# Patient Record
Sex: FEMALE | Race: WHITE | NOT HISPANIC OR LATINO | URBAN - METROPOLITAN AREA
[De-identification: names, ages, dates, MRNs, and addresses within clinical notes are randomized per-mention and may not be internally consistent; named-entity substitution may affect disease eponyms.]

---

## 2018-09-18 ENCOUNTER — OFFICE VISIT (OUTPATIENT)
Dept: URBAN - METROPOLITAN AREA CLINIC 23 | Facility: CLINIC | Age: 78
End: 2018-09-18
Payer: MEDICARE

## 2018-09-18 DIAGNOSIS — H35.3131 NEXDTVE AGE-RELATED MCLR DEGN, BILATERAL, EARLY DRY STAGE: ICD-10-CM

## 2018-09-18 DIAGNOSIS — H47.211 PRIMARY OPTIC ATROPHY, RIGHT EYE: Primary | ICD-10-CM

## 2018-09-18 PROCEDURE — 92014 COMPRE OPH EXAM EST PT 1/>: CPT | Performed by: OPTOMETRIST

## 2018-09-18 ASSESSMENT — INTRAOCULAR PRESSURE
OD: 14
OS: 14

## 2018-09-18 ASSESSMENT — KERATOMETRY
OD: 46.75
OS: 46.25

## 2018-09-18 NOTE — IMPRESSION/PLAN
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 Plan: Discussed diagnosis in detail with patient. No treatment is required at this time. Reassured patient of current condition and treatment. Will continue to observe condition and or symptoms. Recommend that patient have regular vascular exam with her cardiologist at next visit due to sudden vision loss.

## 2019-05-15 ENCOUNTER — OFFICE VISIT (OUTPATIENT)
Dept: URBAN - METROPOLITAN AREA CLINIC 23 | Facility: CLINIC | Age: 79
End: 2019-05-15
Payer: MEDICARE

## 2019-05-15 DIAGNOSIS — H53.19 OTHER SUBJECTIVE VISUAL DISTURBANCES: Primary | ICD-10-CM

## 2019-05-15 DIAGNOSIS — H43.393 OTHER VITREOUS OPACITIES, BILATERAL: ICD-10-CM

## 2019-05-15 PROCEDURE — 92014 COMPRE OPH EXAM EST PT 1/>: CPT | Performed by: OPTOMETRIST

## 2019-05-15 ASSESSMENT — INTRAOCULAR PRESSURE
OS: 14
OD: 14

## 2019-05-15 NOTE — IMPRESSION/PLAN
Impression: Other vitreous opacities, bilateral: H43.393. OU. Plan: Discussed diagnosis in detail with patient. Advised patient of condition. No treatment is required at this time. Discussed signs and symptoms of PVD/floaters. Discussed signs and symptoms of retinal detachment. Reassured patient of current condition and treatment. Patient instructed to call if condition gets worse. Will continue to observe.

## 2019-05-15 NOTE — IMPRESSION/PLAN
Impression: Other subjective visual disturbances: H53.19. OU. OS. Plan: Discussed diagnosis in detail with patient. Discussed treatment options with patient. Reassured patient of current condition and treatment. Consult recommended. Refer to PCP for vascular work up with carotid doppler. Consider referral to Cardiologist for TIA.

## 2021-03-04 ENCOUNTER — OFFICE VISIT (OUTPATIENT)
Dept: URBAN - METROPOLITAN AREA CLINIC 23 | Facility: CLINIC | Age: 81
End: 2021-03-04
Payer: MEDICARE

## 2021-03-04 DIAGNOSIS — H47.291 OTHER OPTIC ATROPHY, RIGHT EYE: ICD-10-CM

## 2021-03-04 DIAGNOSIS — H35.3132 NONEXUDATIVE AGE-RELATED MACULAR DEGENERATION, BILATERAL, INTERMEDIATE DRY STAGE: Primary | ICD-10-CM

## 2021-03-04 PROCEDURE — 92014 COMPRE OPH EXAM EST PT 1/>: CPT | Performed by: OPTOMETRIST

## 2021-03-04 ASSESSMENT — INTRAOCULAR PRESSURE
OS: 16
OD: 16

## 2021-03-04 NOTE — IMPRESSION/PLAN
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 Plan: Discussed diagnosis in detail with patient. Advised patient of condition. Reassured patient of current condition and treatment.

## 2021-03-04 NOTE — IMPRESSION/PLAN
Impression: Nonexudative age-related macular degeneration, bilateral, intermediate dry stage: H35.3132 OU. Plan: Discussed diagnosis in detail with patient. Advised patient of condition. Patient instructed to take daily multi vitamin. Use of vitamins has shown to improve the effects of ARMD. Will continue to observe condition/symptoms.

## 2021-03-16 ENCOUNTER — OFFICE VISIT (OUTPATIENT)
Dept: URBAN - METROPOLITAN AREA CLINIC 22 | Facility: CLINIC | Age: 81
End: 2021-03-16
Payer: COMMERCIAL

## 2021-03-16 DIAGNOSIS — H52.13 MYOPIA, BILATERAL: Primary | ICD-10-CM

## 2021-03-16 PROCEDURE — 92012 INTRM OPH EXAM EST PATIENT: CPT | Performed by: STUDENT IN AN ORGANIZED HEALTH CARE EDUCATION/TRAINING PROGRAM

## 2021-03-16 ASSESSMENT — INTRAOCULAR PRESSURE
OD: 18
OS: 16

## 2021-03-16 ASSESSMENT — VISUAL ACUITY
OD: 20/100
OS: 20/25

## 2021-03-16 NOTE — IMPRESSION/PLAN
Impression: Myopia, bilateral: H52.13. Plan: Discussed findings. New Rx finalized and given to patient.